# Patient Record
Sex: FEMALE | Race: WHITE | NOT HISPANIC OR LATINO | URBAN - NONMETROPOLITAN AREA
[De-identification: names, ages, dates, MRNs, and addresses within clinical notes are randomized per-mention and may not be internally consistent; named-entity substitution may affect disease eponyms.]

---

## 2023-03-07 ENCOUNTER — HOSPITAL ENCOUNTER (INPATIENT)
Facility: HOSPITAL | Age: 28
LOS: 3 days | Discharge: HOME | DRG: 885 | End: 2023-03-10
Attending: PSYCHIATRY & NEUROLOGY | Admitting: PSYCHIATRY & NEUROLOGY
Payer: COMMERCIAL

## 2023-03-07 DIAGNOSIS — F11.20 OPIOID USE DISORDER, SEVERE, DEPENDENCE: Primary | ICD-10-CM

## 2023-03-07 PROBLEM — F15.20 METHAMPHETAMINE USE DISORDER, SEVERE: Status: ACTIVE | Noted: 2023-03-07

## 2023-03-07 PROBLEM — F31.9 BIPOLAR I DISORDER WITH MIXED FEATURES: Status: ACTIVE | Noted: 2023-03-07

## 2023-03-07 PROBLEM — F12.20 TETRAHYDROCANNABINOL (THC) USE DISORDER, SEVERE, DEPENDENCE: Status: ACTIVE | Noted: 2023-03-07

## 2023-03-07 PROBLEM — R45.851 SUICIDAL IDEATION: Status: ACTIVE | Noted: 2023-03-07

## 2023-03-07 PROBLEM — F17.200 NICOTINE USE DISORDER: Status: ACTIVE | Noted: 2023-03-07

## 2023-03-07 LAB
QT INTERVAL: 344 MS
QTC INTERVAL: 448 MS

## 2023-03-07 PROCEDURE — 93010 ELECTROCARDIOGRAM REPORT: CPT | Performed by: INTERNAL MEDICINE

## 2023-03-07 PROCEDURE — 99223 1ST HOSP IP/OBS HIGH 75: CPT | Performed by: PSYCHIATRY & NEUROLOGY

## 2023-03-07 PROCEDURE — 93005 ELECTROCARDIOGRAM TRACING: CPT | Performed by: PSYCHIATRY & NEUROLOGY

## 2023-03-07 RX ORDER — ONDANSETRON 4 MG/1
4 TABLET, FILM COATED ORAL EVERY 8 HOURS PRN
COMMUNITY
End: 2023-03-10 | Stop reason: HOSPADM

## 2023-03-07 RX ORDER — VENLAFAXINE HYDROCHLORIDE 75 MG/1
75 CAPSULE, EXTENDED RELEASE ORAL EVERY MORNING
Status: ON HOLD | COMMUNITY
End: 2023-03-10 | Stop reason: SDUPTHER

## 2023-03-07 RX ORDER — QUETIAPINE FUMARATE 100 MG/1
100 TABLET, FILM COATED ORAL NIGHTLY
Status: ON HOLD | COMMUNITY
End: 2023-03-10 | Stop reason: SDUPTHER

## 2023-03-07 RX ORDER — SENNA PLUS 8.6 MG/1
2 TABLET ORAL NIGHTLY PRN
COMMUNITY
End: 2023-03-10 | Stop reason: HOSPADM

## 2023-03-07 RX ORDER — BUSPIRONE HYDROCHLORIDE 5 MG/1
5 TABLET ORAL 3 TIMES DAILY
Status: DISCONTINUED | OUTPATIENT
Start: 2023-03-07 | End: 2023-03-10 | Stop reason: HOSPADM

## 2023-03-07 RX ORDER — QUETIAPINE FUMARATE 100 MG/1
50 TABLET, FILM COATED ORAL EVERY 12 HOURS SCHEDULED
Status: DISCONTINUED | OUTPATIENT
Start: 2023-03-07 | End: 2023-03-10 | Stop reason: HOSPADM

## 2023-03-07 RX ORDER — HYDROXYZINE HYDROCHLORIDE 25 MG/1
50 TABLET, FILM COATED ORAL EVERY 6 HOURS PRN
Status: DISCONTINUED | OUTPATIENT
Start: 2023-03-07 | End: 2023-03-10 | Stop reason: HOSPADM

## 2023-03-07 RX ORDER — QUETIAPINE FUMARATE 100 MG/1
100 TABLET, FILM COATED ORAL NIGHTLY
Status: CANCELLED | OUTPATIENT
Start: 2023-03-07

## 2023-03-07 RX ORDER — BUPRENORPHINE HYDROCHLORIDE AND NALOXONE HYDROCHLORIDE DIHYDRATE 8; 2 MG/1; MG/1
1.75 TABLET SUBLINGUAL EVERY MORNING
COMMUNITY
Start: 2023-02-15 | End: 2023-03-10 | Stop reason: HOSPADM

## 2023-03-07 RX ORDER — NICOTINE 21 MG/24HR
1 PATCH, TRANSDERMAL 24 HOURS TRANSDERMAL
Status: DISCONTINUED | OUTPATIENT
Start: 2023-03-07 | End: 2023-03-10 | Stop reason: HOSPADM

## 2023-03-07 RX ORDER — BENZTROPINE MESYLATE 1 MG/1
2 TABLET ORAL ONCE AS NEEDED
Status: DISCONTINUED | OUTPATIENT
Start: 2023-03-07 | End: 2023-03-10 | Stop reason: HOSPADM

## 2023-03-07 RX ORDER — NORGESTIMATE AND ETHINYL ESTRADIOL 7DAYSX3 28
1 KIT ORAL DAILY
COMMUNITY
Start: 2023-01-17

## 2023-03-07 RX ORDER — BENZTROPINE MESYLATE 1 MG/ML
1 INJECTION INTRAMUSCULAR; INTRAVENOUS ONCE AS NEEDED
Status: DISCONTINUED | OUTPATIENT
Start: 2023-03-07 | End: 2023-03-10 | Stop reason: HOSPADM

## 2023-03-07 RX ORDER — SENNA PLUS 8.6 MG/1
2 TABLET ORAL NIGHTLY PRN
Status: CANCELLED | OUTPATIENT
Start: 2023-03-07

## 2023-03-07 RX ORDER — BUSPIRONE HYDROCHLORIDE 5 MG/1
5 TABLET ORAL 2 TIMES DAILY
Status: CANCELLED | OUTPATIENT
Start: 2023-03-07

## 2023-03-07 RX ORDER — LOPERAMIDE HYDROCHLORIDE 2 MG/1
2 CAPSULE ORAL
Status: DISCONTINUED | OUTPATIENT
Start: 2023-03-07 | End: 2023-03-10 | Stop reason: HOSPADM

## 2023-03-07 RX ORDER — BENZONATATE 100 MG/1
100 CAPSULE ORAL 3 TIMES DAILY PRN
Status: DISCONTINUED | OUTPATIENT
Start: 2023-03-07 | End: 2023-03-10 | Stop reason: HOSPADM

## 2023-03-07 RX ORDER — IBUPROFEN 400 MG/1
400 TABLET ORAL EVERY 6 HOURS PRN
Status: DISCONTINUED | OUTPATIENT
Start: 2023-03-07 | End: 2023-03-10 | Stop reason: HOSPADM

## 2023-03-07 RX ORDER — ECHINACEA PURPUREA EXTRACT 125 MG
2 TABLET ORAL AS NEEDED
Status: DISCONTINUED | OUTPATIENT
Start: 2023-03-07 | End: 2023-03-10 | Stop reason: HOSPADM

## 2023-03-07 RX ORDER — BUPRENORPHINE HYDROCHLORIDE AND NALOXONE HYDROCHLORIDE DIHYDRATE 8; 2 MG/1; MG/1
1.75 TABLET SUBLINGUAL EVERY MORNING
Status: CANCELLED | OUTPATIENT
Start: 2023-03-08

## 2023-03-07 RX ORDER — ACETAMINOPHEN 500 MG/1
1000 TABLET, FILM COATED ORAL 3 TIMES DAILY PRN
COMMUNITY
Start: 2023-01-23 | End: 2023-03-10 | Stop reason: HOSPADM

## 2023-03-07 RX ORDER — VENLAFAXINE HYDROCHLORIDE 37.5 MG/1
37.5 CAPSULE, EXTENDED RELEASE ORAL
Status: DISCONTINUED | OUTPATIENT
Start: 2023-03-07 | End: 2023-03-10 | Stop reason: HOSPADM

## 2023-03-07 RX ORDER — SODIUM FLUORIDE 6 MG/ML
1 PASTE, DENTIFRICE DENTAL DAILY
COMMUNITY
Start: 2023-02-06

## 2023-03-07 RX ORDER — ONDANSETRON 4 MG/1
4 TABLET, FILM COATED ORAL EVERY 6 HOURS PRN
Status: DISCONTINUED | OUTPATIENT
Start: 2023-03-07 | End: 2023-03-10 | Stop reason: HOSPADM

## 2023-03-07 RX ORDER — ALUMINA, MAGNESIA, AND SIMETHICONE 2400; 2400; 240 MG/30ML; MG/30ML; MG/30ML
15 SUSPENSION ORAL EVERY 6 HOURS PRN
Status: DISCONTINUED | OUTPATIENT
Start: 2023-03-07 | End: 2023-03-10 | Stop reason: HOSPADM

## 2023-03-07 RX ORDER — BUSPIRONE HYDROCHLORIDE 5 MG/1
5 TABLET ORAL 2 TIMES DAILY
Status: ON HOLD | COMMUNITY
Start: 2023-02-07 | End: 2023-03-10 | Stop reason: SDUPTHER

## 2023-03-07 RX ORDER — FAMOTIDINE 20 MG/1
20 TABLET, FILM COATED ORAL 2 TIMES DAILY PRN
Status: DISCONTINUED | OUTPATIENT
Start: 2023-03-07 | End: 2023-03-10 | Stop reason: HOSPADM

## 2023-03-07 RX ORDER — IBUPROFEN 400 MG/1
600 TABLET ORAL EVERY 6 HOURS PRN
Status: CANCELLED | OUTPATIENT
Start: 2023-03-07

## 2023-03-07 RX ORDER — GUANFACINE 1 MG/1
1 TABLET ORAL EVERY MORNING
Status: CANCELLED | OUTPATIENT
Start: 2023-03-08

## 2023-03-07 RX ORDER — VENLAFAXINE HYDROCHLORIDE 75 MG/1
75 CAPSULE, EXTENDED RELEASE ORAL EVERY MORNING
Status: CANCELLED | OUTPATIENT
Start: 2023-03-08

## 2023-03-07 RX ORDER — ACETAMINOPHEN 500 MG
1000 TABLET ORAL 3 TIMES DAILY PRN
Status: CANCELLED | OUTPATIENT
Start: 2023-03-07

## 2023-03-07 RX ORDER — TRAZODONE HYDROCHLORIDE 50 MG/1
50 TABLET ORAL NIGHTLY PRN
Status: DISCONTINUED | OUTPATIENT
Start: 2023-03-07 | End: 2023-03-10 | Stop reason: HOSPADM

## 2023-03-07 RX ORDER — ONDANSETRON 4 MG/1
4 TABLET, FILM COATED ORAL EVERY 8 HOURS PRN
Status: CANCELLED | OUTPATIENT
Start: 2023-03-07

## 2023-03-07 RX ORDER — GUANFACINE 1 MG/1
1 TABLET ORAL EVERY MORNING
COMMUNITY
End: 2023-03-10 | Stop reason: HOSPADM

## 2023-03-07 RX ORDER — NALOXONE HYDROCHLORIDE 4 MG/.1ML
1 SPRAY NASAL AS NEEDED
COMMUNITY

## 2023-03-07 RX ORDER — IBUPROFEN 600 MG/1
600 TABLET ORAL EVERY 6 HOURS PRN
COMMUNITY
Start: 2023-03-05 | End: 2023-03-10 | Stop reason: HOSPADM

## 2023-03-07 RX ORDER — ACETAMINOPHEN 325 MG/1
650 TABLET ORAL EVERY 6 HOURS PRN
Status: DISCONTINUED | OUTPATIENT
Start: 2023-03-07 | End: 2023-03-10 | Stop reason: HOSPADM

## 2023-03-07 RX ADMIN — BUSPIRONE HYDROCHLORIDE 5 MG: 5 TABLET ORAL at 15:36

## 2023-03-07 RX ADMIN — QUETIAPINE FUMARATE 50 MG: 100 TABLET ORAL at 23:04

## 2023-03-07 RX ADMIN — NICOTINE TRANSDERMAL SYSTEM 1 PATCH: 21 PATCH, EXTENDED RELEASE TRANSDERMAL at 11:35

## 2023-03-07 RX ADMIN — QUETIAPINE FUMARATE 50 MG: 100 TABLET ORAL at 14:43

## 2023-03-07 RX ADMIN — BUSPIRONE HYDROCHLORIDE 5 MG: 5 TABLET ORAL at 23:04

## 2023-03-07 RX ADMIN — VENLAFAXINE HYDROCHLORIDE 37.5 MG: 37.5 CAPSULE, EXTENDED RELEASE ORAL at 14:43

## 2023-03-07 NOTE — PROGRESS NOTES
Navigator is helping Primary Therapist with discharge planning for patient. Zullyator completed the following referrals on this day:    [facility] - [phone]  -Sent 3/7  -Breann asks that we send updated MD notes closer to discharge. 3/7

## 2023-03-07 NOTE — DISCHARGE PLACEMENT REQUEST
"Nory Miller (27 y.o. Female)     Date of Birth   1995    Social Security Number       Address   [address] [address]    Home Phone   [phone]    MRN   [id number]       Scientologist   None    Marital Status   Legally                             Admission Date   3/7/23    Admission Type   Urgent    Admitting Provider   Denys Duvall MD    Attending Provider   Denys Duvall MD    Department, Room/Bed   [facility] ADULT PSYCHIATRIC, 1013/2S       Discharge Date       Discharge Disposition       Discharge Destination                               Attending Provider: Denys Duvall MD    Allergies: Not on File    Isolation: None   Infection: None   Code Status: CPR    Ht: 152.4 cm (60\")   Wt: 62.1 kg (136 lb 12.8 oz)    Admission Cmt: None   Principal Problem: Suicidal ideation [R45.851]                 Active Insurance as of 3/7/2023     Primary Coverage     Payor Plan Insurance Group Employer/Plan Group    [employer] [place] [employer] [place]      Payor Plan Address Payor Plan Phone Number Payor Plan Fax Number Effective Dates    [address] [address] [phone]  3/7/2023 - None Entered    [place] [address]       Subscriber Name Subscriber Birth Date Member ID       NORY MILLER 1995 [id number]                 Emergency Contacts      (Rel.) Home Phone Work Phone Mobile Phone    MELISSA MILLER (Other) [phone] -- --               History & Physical      Massiel Caceres MD at 23 1213                INITIAL PSYCHIATRIC HISTORY & PHYSICAL    Patient Identification:  Name:  Nory Miller  Age:  27 y.o.  Sex:  female  :  1995  MRN:  [id number]   Visit Number:  [id number]  Primary Care Physician:  Provider, No Known    SUBJECTIVE    CC/Focus of Exam: suicidal thoughts    HPI: Nory Miller is a 27 y.o. female who was admitted on 3/7/2023 with complaints of suicidal thoughts. She states she was at [facility] " rehab for the last three weeks and she was at [facility] for a month prior to that. She state she was not given her psychiatric medications at the [facility] rehab and it made her more depressed and suicidal and she came to this hospital. She reports a past history of bipolar disorder in the past. She reports manic episodes lasting 24 to 72 hours where she has racing thoughts, pressured speech, has more goal directed activity, sleeps very little and doing high risk behaviors and ends up in crashing and in deep depression lasting weeks on end where she feels down, has no energy, doesn't enjoy anything, hopeless, and suicidal. She states she is feeling hyper as well as depressed at this time.     PAST PSYCHIATRIC HX: The patient reports she has been admitted to inpatient psych units six times in the last ten years.     SUBSTANCE USE HX: The patient reports a long history of substance use. First use was at age 12 when she used pain pills. Over time the use increased and the patient  continued to use despite negative consequences. The patient endorses symptoms of tolerance and withdrawals. Has tried to cut down and stop but has not been successful. Spends too much time and resources in pursuit of substance use. Longest period of sobriety is reported to be 1 year.  She states she was using meth, marijuana and pain pills but has not use any in more than six weeks. She is on Suboxone maintenance but was not given any in her last rehab for about two weeks.    SOCIAL HX:   Social History     Socioeconomic History   • Marital status: Legally    Tobacco Use   • Smoking status: Every Day     Packs/day: 0.50     Years: 14.00     Pack years: 7.00     Types: Cigarettes   • Smokeless tobacco: Never   Vaping Use   • Vaping Use: Former   • Passive vaping exposure: Yes   Substance and Sexual Activity   • Alcohol use: Not Currently   • Drug use: Not Currently     Comment: Pt stated she is a month and  3 weeks sober         History reviewed. No pertinent past medical history.       Past Surgical History:   Procedure Laterality Date   • D & C CERVICAL BIOPSY     • TONSILLECTOMY     • TUMOR EXCISION         Family History   Problem Relation Age of Onset   • Suicide Attempts Mother    • Paranoid behavior Mother    • Drug abuse Mother    • Depression Mother    • Bipolar disorder Mother    • Anxiety disorder Mother    • Alcohol abuse Mother    • Alcohol abuse Father    • Drug abuse Father    • Bipolar disorder Father    • Depression Brother    • Drug abuse Brother    • Alcohol abuse Brother    • Anxiety disorder Brother    • Bipolar disorder Brother          Medications Prior to Admission   Medication Sig Dispense Refill Last Dose   • buprenorphine-naloxone (SUBOXONE) 8-2 MG per SL tablet Place 1.75 tablets under the tongue Every Morning.   Past Month   • busPIRone (BUSPAR) 5 MG tablet Take 1 tablet by mouth 2 (Two) Times a Day.   Past Month   • guanFACINE (TENEX) 1 MG tablet Take 1 tablet by mouth Every Morning.   Past Month   • ibuprofen (ADVIL,MOTRIN) 600 MG tablet Take 1 tablet by mouth Every 6 (Six) Hours As Needed for Pain.   Past Month   • naloxone (NARCAN) 4 MG/0.1ML nasal spray 1 spray into the nostril(s) as directed by provider As Needed (overdosage).   Past Month   • norgestimate-ethinyl estradiol (ORTHO TRI-CYCLEN,TRINESSA) 0.18/0.215/0.25 MG-35 MCG per tablet Take 1 tablet by mouth Daily.   Past Month   • ondansetron (ZOFRAN) 4 MG tablet Take 1 tablet by mouth Every 8 (Eight) Hours As Needed for Nausea or Vomiting.   Past Month   • Pain Relief Extra Strength 500 MG tablet Take 2 tablets by mouth 3 (Three) Times a Day As Needed for Mild Pain or Moderate Pain.   Past Month   • QUEtiapine (SEROquel) 100 MG tablet Take 1 tablet by mouth Every Night.   Past Month   • senna (SENOKOT) 8.6 MG tablet Take 2 tablets by mouth At Night As Needed for Constipation.   Past Month   • Sodium Fluoride 5000 PPM 1.1 % paste  Take 1 application by mouth Daily. Use pea sized amount to brush teeth as directed daily   Past Month   • venlafaxine XR (EFFEXOR-XR) 75 MG 24 hr capsule Take 1 capsule by mouth Every Morning.   Past Month         ALLERGIES:  Patient has no allergy information on record.    Temp:  [97.9 °F (36.6 °C)-98.6 °F (37 °C)] 98.6 °F (37 °C)  Heart Rate:  [] 99  Resp:  [18] 18  BP: (106-113)/(69-77) 106/69    REVIEW OF SYSTEMS:  Review of Systems   Constitutional: Positive for chills and diaphoresis.   HENT: Negative.    Eyes: Negative.    Respiratory: Negative.    Cardiovascular: Negative.    Gastrointestinal: Negative.    Endocrine: Negative.    Genitourinary: Negative.    Musculoskeletal: Negative.    Skin: Negative.    Allergic/Immunologic: Negative.    Neurological: Negative.    Hematological: Negative.    Psychiatric/Behavioral: Positive for dysphoric mood and suicidal ideas. The patient is nervous/anxious and is hyperactive.         OBJECTIVE    PHYSICAL EXAM:  Physical Exam  Constitutional:  Appears well-developed and well-nourished.   HENT:   Head: Normocephalic and atraumatic.   Right Ear: External ear normal.   Left Ear: External ear normal.   Mouth/Throat: Oropharynx is clear and moist.   Eyes: Pupils are equal, round, and reactive to light. Conjunctivae and EOM are normal.   Neck: Normal range of motion. Neck supple.   Cardiovascular: Normal rate, regular rhythm and normal heart sounds.    Respiratory: Effort normal and breath sounds normal. No respiratory distress. No wheezes.   GI: Soft. Bowel sounds are normal.No distension. There is no tenderness.   Musculoskeletal: Normal range of motion. No edema or deformity.   Neurological:No cranial nerve deficit. Coordination normal.   Skin: Skin is warm and dry. No rash noted. No erythema.       MENTAL STATUS EXAM:   Hygiene:   fair  Cooperation:  Cooperative  Eye Contact:  Fair  Psychomotor Behavior:  Restless  Affect:  Full range  Hopelessness: 10  Speech:   Pressured  Pressured by  Thought Content:  Normal  Suicidal:  Suicidal Ideation  Homicidal:  None  Hallucinations:  None  Delusion:  None  Memory:  Intact  Orientation:  Person, Place, Time and Situation  Reliability:  fair  Insight:  Fair  Judgement:  Fair  Impulse Control:  Fair      Imaging Results (Last 24 Hours)     ** No results found for the last 24 hours. **           ECG/EMG Results (most recent)     Procedure Component Value Units Date/Time    ECG 12 Lead Other [[id number]] Collected: 03/07/23 0951     Updated: 03/07/23 0953     QT Interval 344 ms      QTC Interval 448 ms     Narrative:      Test Reason : Baseline Cardiac Status~  Blood Pressure :   */*   mmHG  Vent. Rate : 102 BPM     Atrial Rate : 102 BPM     P-R Int : 112 ms          QRS Dur :  78 ms      QT Int : 344 ms       P-R-T Axes :  55  54  39 degrees     QTc Int : 448 ms    Sinus tachycardia  Otherwise normal ECG  No previous ECGs available    Referred By: PAOLO           Confirmed By:            No results found for: GLUCOSE, BUN, CREATININE, EGFRIFNONA, EGFRIFAFRI, BCR, POTASSIUM, CO2, CALCIUM, PROTENTOTREF, ALBUMIN, LABIL2, BILIRUBIN, AST, ALT    No results found for: WBC, HGB, HCT, MCV, PLT    Last Urine Toxicity    There is no flowsheet data to display.         Brief Urine Lab Results     None          DATA  Labs reviewed. From OSH.   EKG reviewed. QTc 448 ms.   SEVEN reviewed.   Record reviewed. No previous treatment noted in this hospital for mental health or substance use problems.       Strengths: Motivated for treatment    Weaknesses:Poor social support, Substance use and Poor coping skills    Code status:  Full  Discussed code status with patient.    ASSESSMENT & PLAN:        Suicidal ideation  -SP3      Bipolar I disorder with mixed features (HCC)  -Start quetiapine 50 mg bid  -Start venlafaxine XR 37.5 mg daily  -Start buspirone 5 mg tid      Opioid use disorder, severe, dependence (HCC)  -Patient reports no use for 2-3 weeks       Methamphetamine use disorder, severe (HCC)  -Supportive treatment      Tetrahydrocannabinol (THC) use disorder, severe, dependence (HCC)  -Supportive treatment      Nicotine use disorder  -Nicotine replacement      The patient has been admitted for safety and stabilization.  Patient will be monitored for suicidality daily and maintained on Special Precautions Level 3 (q15 min checks) .  The patient will have individual and group therapy with a master's level therapist. A master treatment plan will be developed and agreed upon by the patient and his/her treatment team.  The patient's estimated length of stay in the hospital is 5-7 days.             Electronically signed by Massiel Caceres MD at 03/07/23 7595         Current Facility-Administered Medications   Medication Dose Route Frequency Provider Last Rate Last Admin   • acetaminophen (TYLENOL) tablet 650 mg  650 mg Oral Q6H PRN Denys Duvall MD       • aluminum-magnesium hydroxide-simethicone (MAALOX MAX) 400-400-40 MG/5ML suspension 15 mL  15 mL Oral Q6H PRN Denys Duvall MD       • benzonatate (TESSALON) capsule 100 mg  100 mg Oral TID PRN Denys Duvall MD       • benztropine (COGENTIN) tablet 2 mg  2 mg Oral Once PRN Denys Duvall MD        Or   • benztropine (COGENTIN) injection 1 mg  1 mg Intramuscular Once PRN Denys Duvall MD       • busPIRone (BUSPAR) tablet 5 mg  5 mg Oral TID Massiel Caceres MD       • famotidine (PEPCID) tablet 20 mg  20 mg Oral BID PRN Denys Duvall MD       • hydrOXYzine (ATARAX) tablet 50 mg  50 mg Oral Q6H PRN Denys Duvall MD       • ibuprofen (ADVIL,MOTRIN) tablet 400 mg  400 mg Oral Q6H PRN Denys Duvall MD       • loperamide (IMODIUM) capsule 2 mg  2 mg Oral Q2H PRN Denys Duvall MD       • magnesium hydroxide (MILK OF MAGNESIA) suspension 10 mL  10 mL Oral Daily PRN Denys Duvall MD       • nicotine (NICODERM CQ) 21 MG/24HR patch 1 patch  1 patch Transdermal Q24H Denys Duvall MD   1  patch at 03/07/23 1135   • ondansetron (ZOFRAN) tablet 4 mg  4 mg Oral Q6H PRN Denys Duvall MD       • QUEtiapine (SEROquel) tablet 50 mg  50 mg Oral Q12H Massiel Caceres MD       • sodium chloride nasal spray 2 spray  2 spray Each Nare PRN Denys Duvall MD       • traZODone (DESYREL) tablet 50 mg  50 mg Oral Nightly PRN Denys Duvall MD       • venlafaxine XR (EFFEXOR-XR) 24 hr capsule 37.5 mg  37.5 mg Oral Daily With Breakfast Massiel Caceres MD         Physician Progress Notes (last 24 hours)  Notes from 03/06/23 1254 through 03/07/23 1254   No notes of this type exist for this encounter.
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
## 2023-03-07 NOTE — H&P
INITIAL PSYCHIATRIC HISTORY & PHYSICAL    Patient Identification:  Name:  Lawanda Miller  Age:  27 y.o.  Sex:  female  :  1995  MRN:  [id number]   Visit Number:  [id number]  Primary Care Physician:  Provider, No Known    SUBJECTIVE    CC/Focus of Exam: suicidal thoughts    HPI: Lawanda Miller is a 27 y.o. female who was admitted on 3/7/2023 with complaints of suicidal thoughts. She states she was at [facility] for the last three weeks and she was at [facility] for a month prior to that. She state she was not given her psychiatric medications at the [facility] and it made her more depressed and suicidal and she came to this hospital. She reports a past history of bipolar disorder in the past. She reports manic episodes lasting 24 to 72 hours where she has racing thoughts, pressured speech, has more goal directed activity, sleeps very little and doing high risk behaviors and ends up in crashing and in deep depression lasting weeks on end where she feels down, has no energy, doesn't enjoy anything, hopeless, and suicidal. She states she is feeling hyper as well as depressed at this time.     PAST PSYCHIATRIC HX: The patient reports she has been admitted to inpatient psych units six times in the last ten years.     SUBSTANCE USE HX: The patient reports a long history of substance use. First use was at age 12 when she used pain pills. Over time the use increased and the patient  continued to use despite negative consequences. The patient endorses symptoms of tolerance and withdrawals. Has tried to cut down and stop but has not been successful. Spends too much time and resources in pursuit of substance use. Longest period of sobriety is reported to be 1 year.  She states she was using meth, marijuana and pain pills but has not use any in more than six weeks. She is on Suboxone maintenance but was not given any in her last rehab for about two  weeks.    SOCIAL HX:   Social History     Socioeconomic History   • Marital status: Legally    Tobacco Use   • Smoking status: Every Day     Packs/day: 0.50     Years: 14.00     Pack years: 7.00     Types: Cigarettes   • Smokeless tobacco: Never   Vaping Use   • Vaping Use: Former   • Passive vaping exposure: Yes   Substance and Sexual Activity   • Alcohol use: Not Currently   • Drug use: Not Currently     Comment: Pt stated she is a month and 3 weeks sober         History reviewed. No pertinent past medical history.       Past Surgical History:   Procedure Laterality Date   • D & C CERVICAL BIOPSY     • TONSILLECTOMY     • TUMOR EXCISION         Family History   Problem Relation Age of Onset   • Suicide Attempts Mother    • Paranoid behavior Mother    • Drug abuse Mother    • Depression Mother    • Bipolar disorder Mother    • Anxiety disorder Mother    • Alcohol abuse Mother    • Alcohol abuse Father    • Drug abuse Father    • Bipolar disorder Father    • Depression Brother    • Drug abuse Brother    • Alcohol abuse Brother    • Anxiety disorder Brother    • Bipolar disorder Brother          Medications Prior to Admission   Medication Sig Dispense Refill Last Dose   • buprenorphine-naloxone (SUBOXONE) 8-2 MG per SL tablet Place 1.75 tablets under the tongue Every Morning.   Past Month   • busPIRone (BUSPAR) 5 MG tablet Take 1 tablet by mouth 2 (Two) Times a Day.   Past Month   • guanFACINE (TENEX) 1 MG tablet Take 1 tablet by mouth Every Morning.   Past Month   • ibuprofen (ADVIL,MOTRIN) 600 MG tablet Take 1 tablet by mouth Every 6 (Six) Hours As Needed for Pain.   Past Month   • naloxone (NARCAN) 4 MG/0.1ML nasal spray 1 spray into the nostril(s) as directed by provider As Needed (overdosage).   Past Month   • norgestimate-ethinyl estradiol (ORTHO TRI-CYCLEN,TRINESSA) 0.18/0.215/0.25 MG-35 MCG per tablet Take 1 tablet by mouth Daily.   Past Month   • ondansetron (ZOFRAN) 4 MG tablet Take 1 tablet by  mouth Every 8 (Eight) Hours As Needed for Nausea or Vomiting.   Past Month   • Pain Relief Extra Strength 500 MG tablet Take 2 tablets by mouth 3 (Three) Times a Day As Needed for Mild Pain or Moderate Pain.   Past Month   • QUEtiapine (SEROquel) 100 MG tablet Take 1 tablet by mouth Every Night.   Past Month   • senna (SENOKOT) 8.6 MG tablet Take 2 tablets by mouth At Night As Needed for Constipation.   Past Month   • Sodium Fluoride 5000 PPM 1.1 % paste Take 1 application by mouth Daily. Use pea sized amount to brush teeth as directed daily   Past Month   • venlafaxine XR (EFFEXOR-XR) 75 MG 24 hr capsule Take 1 capsule by mouth Every Morning.   Past Month         ALLERGIES:  Patient has no allergy information on record.    Temp:  [97.9 °F (36.6 °C)-98.6 °F (37 °C)] 98.6 °F (37 °C)  Heart Rate:  [] 99  Resp:  [18] 18  BP: (106-113)/(69-77) 106/69    REVIEW OF SYSTEMS:  Review of Systems   Constitutional: Positive for chills and diaphoresis.   HENT: Negative.    Eyes: Negative.    Respiratory: Negative.    Cardiovascular: Negative.    Gastrointestinal: Negative.    Endocrine: Negative.    Genitourinary: Negative.    Musculoskeletal: Negative.    Skin: Negative.    Allergic/Immunologic: Negative.    Neurological: Negative.    Hematological: Negative.    Psychiatric/Behavioral: Positive for dysphoric mood and suicidal ideas. The patient is nervous/anxious and is hyperactive.         OBJECTIVE    PHYSICAL EXAM:  Physical Exam  Constitutional:  Appears well-developed and well-nourished.   HENT:   Head: Normocephalic and atraumatic.   Right Ear: External ear normal.   Left Ear: External ear normal.   Mouth/Throat: Oropharynx is clear and moist.   Eyes: Pupils are equal, round, and reactive to light. Conjunctivae and EOM are normal.   Neck: Normal range of motion. Neck supple.   Cardiovascular: Normal rate, regular rhythm and normal heart sounds.    Respiratory: Effort normal and breath sounds normal. No respiratory  distress. No wheezes.   GI: Soft. Bowel sounds are normal.No distension. There is no tenderness.   Musculoskeletal: Normal range of motion. No edema or deformity.   Neurological:No cranial nerve deficit. Coordination normal.   Skin: Skin is warm and dry. No rash noted. No erythema.       MENTAL STATUS EXAM:   Hygiene:   fair  Cooperation:  Cooperative  Eye Contact:  Fair  Psychomotor Behavior:  Restless  Affect:  Full range  Hopelessness: 10  Speech:  Pressured  Pressured by  Thought Content:  Normal  Suicidal:  Suicidal Ideation  Homicidal:  None  Hallucinations:  None  Delusion:  None  Memory:  Intact  Orientation:  Person, Place, Time and Situation  Reliability:  fair  Insight:  Fair  Judgement:  Fair  Impulse Control:  Fair      Imaging Results (Last 24 Hours)     ** No results found for the last 24 hours. **           ECG/EMG Results (most recent)     Procedure Component Value Units Date/Time    ECG 12 Lead Other [[id number]] Collected: 03/07/23 0951     Updated: 03/07/23 0953     QT Interval 344 ms      QTC Interval 448 ms     Narrative:      Test Reason : Baseline Cardiac Status~  Blood Pressure :   */*   mmHG  Vent. Rate : 102 BPM     Atrial Rate : 102 BPM     P-R Int : 112 ms          QRS Dur :  78 ms      QT Int : 344 ms       P-R-T Axes :  55  54  39 degrees     QTc Int : 448 ms    Sinus tachycardia  Otherwise normal ECG  No previous ECGs available    Referred By: PAOLO           Confirmed By:            No results found for: GLUCOSE, BUN, CREATININE, EGFRIFNONA, EGFRIFAFRI, BCR, POTASSIUM, CO2, CALCIUM, PROTENTOTREF, ALBUMIN, LABIL2, BILIRUBIN, AST, ALT    No results found for: WBC, HGB, HCT, MCV, PLT    Last Urine Toxicity    There is no flowsheet data to display.         Brief Urine Lab Results     None          DATA  Labs reviewed. From OSH.   EKG reviewed. QTc 448 ms.   SEVEN reviewed.   Record reviewed. No previous treatment noted in this hospital for mental health or substance use problems.        Strengths: Motivated for treatment    Weaknesses:Poor social support, Substance use and Poor coping skills    Code status:  Full  Discussed code status with patient.    ASSESSMENT & PLAN:        Suicidal ideation  -SP3      Bipolar I disorder with mixed features (HCC)  -Start quetiapine 50 mg bid  -Start venlafaxine XR 37.5 mg daily  -Start buspirone 5 mg tid      Opioid use disorder, severe, dependence (HCC)  -Patient reports no use for 2-3 weeks      Methamphetamine use disorder, severe (HCC)  -Supportive treatment      Tetrahydrocannabinol (THC) use disorder, severe, dependence (HCC)  -Supportive treatment      Nicotine use disorder  -Nicotine replacement      The patient has been admitted for safety and stabilization.  Patient will be monitored for suicidality daily and maintained on Special Precautions Level 3 (q15 min checks) .  The patient will have individual and group therapy with a master's level therapist. A master treatment plan will be developed and agreed upon by the patient and his/her treatment team.  The patient's estimated length of stay in the hospital is 5-7 days.

## 2023-03-07 NOTE — PLAN OF CARE
Problem: Adult Behavioral Health Plan of Care  Goal: Plan of Care Review  Outcome: Ongoing, Progressing  Flowsheets  Taken 3/7/2023 1352  Progress: no change  Outcome Evaluation: PATIENT VERBALIZES ANXIETY AND DEPRESSION, SI WITH NO PLAN AS ONLY PROBLEMS THIS SHIFT. PATIENT IS AOX3, COOPERATIVE WITH NO S/S OF ACUTE DISTRESS NOTED.  NEW ORDERS: EFFEXOR 37.5, SEROQUEL 50MG, BUSPAR 5MG  Taken 3/7/2023 0739  Plan of Care Reviewed With: patient  Patient Agreement with Plan of Care: agrees   Goal Outcome Evaluation:  Plan of Care Reviewed With: patient  Patient Agreement with Plan of Care: agrees     Progress: no change  Outcome Evaluation: PATIENT VERBALIZES ANXIETY AND DEPRESSION, SI WITH NO PLAN AS ONLY PROBLEMS THIS SHIFT. PATIENT IS AOX3, COOPERATIVE WITH NO S/S OF ACUTE DISTRESS NOTED.  NEW ORDERS: EFFEXOR 37.5, SEROQUEL 50MG, BUSPAR 5MG

## 2023-03-07 NOTE — PLAN OF CARE
Goal Outcome Evaluation:  Plan of Care Reviewed With: patient            Pt stated she was at a rehab and been there 3 weeks and they wont let her have any of her psych medication so she has come here for us to find her rehab to go to that will let her take her meds. Pt stated without her medication she was having suicidal thougts. Pt is restless and walking around room and can not be still durign assessment. Pt stated she was real nervous at [facility] and they gave her a xanax and it worked really good.

## 2023-03-08 PROCEDURE — 99232 SBSQ HOSP IP/OBS MODERATE 35: CPT | Performed by: PSYCHIATRY & NEUROLOGY

## 2023-03-08 RX ADMIN — BUSPIRONE HYDROCHLORIDE 5 MG: 5 TABLET ORAL at 15:50

## 2023-03-08 RX ADMIN — NICOTINE TRANSDERMAL SYSTEM 1 PATCH: 21 PATCH, EXTENDED RELEASE TRANSDERMAL at 08:14

## 2023-03-08 RX ADMIN — QUETIAPINE FUMARATE 50 MG: 100 TABLET ORAL at 20:18

## 2023-03-08 RX ADMIN — BUSPIRONE HYDROCHLORIDE 5 MG: 5 TABLET ORAL at 20:18

## 2023-03-08 RX ADMIN — VENLAFAXINE HYDROCHLORIDE 37.5 MG: 37.5 CAPSULE, EXTENDED RELEASE ORAL at 08:14

## 2023-03-08 RX ADMIN — BUSPIRONE HYDROCHLORIDE 5 MG: 5 TABLET ORAL at 08:14

## 2023-03-08 RX ADMIN — QUETIAPINE FUMARATE 50 MG: 100 TABLET ORAL at 08:14

## 2023-03-08 RX ADMIN — TRAZODONE HYDROCHLORIDE 50 MG: 50 TABLET ORAL at 22:14

## 2023-03-08 NOTE — PLAN OF CARE
Goal Outcome Evaluation:        Problem: Adult Behavioral Health Plan of Care  Goal: Patient-Specific Goal (Individualization)  Outcome: Ongoing, Progressing  Flowsheets  Taken 3/7/2023 1137  Patient-Specific Goals (Include Timeframe): Identify 2-3 coping skills, address relapse prevention methods, complete aftercrare plans, and deny SI/HI prior to discharge.  Individualized Care Needs: Therapist to offer 1-4 therapy sessions, aftercare planning, safety planning, family education, group therapy, brief CBT/MI interventions.  Anxieties, Fears or Concerns: none verbalized  Taken 3/7/2023 1002  Patient Personal Strengths:   resilient   resourceful   parenting skills   self-awareness   motivated for recovery   motivated for treatment   positive educational history   spiritual/[place] support   intellectual cognitive skills  Patient Vulnerabilities:   substance abuse/addiction   poor impulse control   housing insecurity   occupational insecurity   limited support system   history of unsuccessful treatment   adverse childhood experience(s)   traumatic event  Goal: Optimized Coping Skills in Response to Life Stressors  Outcome: Ongoing, Progressing  Flowsheets (Taken 3/7/2023 1137)  Optimized Coping Skills in Response to Life Stressors: making progress toward outcome  Intervention: Promote Effective Coping Strategies  Flowsheets (Taken 3/8/2023 1346)  Supportive Measures:   active listening utilized   counseling provided   decision-making supported   goal-setting facilitated   positive reinforcement provided   verbalization of feelings encouraged  Goal: Develops/Participates in Therapeutic Beaver to Support Successful Transition  Outcome: Ongoing, Progressing  Flowsheets (Taken 3/7/2023 1137)  Develops/Participates in Therapeutic Beaver to Support Successful Transition: making progress toward outcome  Intervention: Foster Therapeutic Beaver  Flowsheets (Taken 3/8/2023 4962 by Dave Guillen RN)  Trust  Relationship/Rapport:   care explained   choices provided   emotional support provided   empathic listening provided   questions answered   questions encouraged   reassurance provided   thoughts/feelings acknowledged  Intervention: Mutually Develop Transition Plan  Flowsheets  Taken 3/8/2023 1344  Discharge Coordination/Progress: Patient has [employer] Medicaid. Patient has referral pending with [facility].  Transportation Anticipated:   public transportation   agency  Transportation Concerns: no car  Current Discharge Risk:   substance use/abuse   homeless   psychiatric illness   lack of support system/caregiver  Concerns to be Addressed:   substance/tobacco abuse/use   coping/stress   suicidal   mental health   medication   discharge planning  Readmission Within the Last 30 Days: no previous admission in last 30 days  Patient/Family Anticipated Services at Transition: rehabilitation services  Patient's Choice of Community Agency(s): [facility]  Patient/Family Anticipates Transition to: inpatient rehabilitation facility  Offered/Gave Vendor List: no  Taken 3/7/2023 1137  Outpatient/Agency/Support Group Needs: residential services  Transition Support:   follow-up care discussed   community resources reviewed   crisis management plan promoted   crisis management plan verbalized   follow-up care coordinated  Anticipated Discharge Disposition: residential substance use unit         DATA:  Therapist met with Patient individually this date. Patient agreeable to discuss current treatment progress and discharge concerns.     CLINICAL MANUVERING/INTERVENTIONS:  Assisted Patient in processing session content; acknowledged and normalized Patient’s thoughts, feelings, and concerns by utilizing a person-centered approach in efforts to build appropriate rapport and a positive therapeutic relationship with open and honest communication. Allowed Patient to ventilate regarding current stressors and triggers for negative emotions and thoughts in a  safe nonjudgmental environment with unconditional positive regard, active listening skills, and empathy. Therapist implemented motivational interviewing techniques to assist Patient with exploring personal growth and change and discussed distress tolerance skills, self soothing techniques, and applied cognitive behavioral strategies to facilitate identification of maladaptive patterns of thinking and behavior.Therapist utilized dialectical behavior techniques to teach and model emotional regulation and relaxation methods. Therapist assisted Patient with identifying and implementing healthier coping strategies. Therapist assisted Patient with safety planning; Patient agreed to continue honest communication with Treatment Team while inpatient and identify any SI/HI.  Patient encouraged to seek nearest ER or contact 911 if danger to self or others post discharge.     ASSESSMENT:    Therapist met with patient 1-1 on this date. Patient continues to receive treatment for depression with SI. Patient continues to report high anxiety and depression, denies current HI/AVH. Patient reports intermittent SI although it is improving. Patient reports she is having really strong cravings and is requesting to find a rehab that will also allow Suboxone. Patient has referral pending with [facility], updated notes sent on this date. Therapist has discussed with patient how new rehab placement options are limited because she has already been in rehab for the past three weeks and denies using since then. Patient initially presents from [facility] in [place] and reports she was not allowed to take psych medications while at facility. Patient also requests a facility close to [facility] if possible because that is where her child is at this time.      PLAN:   Patient will continue stabilization. Patient will continue to receive services offered by Treatment Team.     Therapist recommends [facility] inpatient rehab. Referral pending with [facility].

## 2023-03-08 NOTE — PROGRESS NOTES
"INPATIENT PSYCHIATRIC PROGRESS NOTE    Name:  Lawanda Miller  :  1995  MRN:  [id number]  Visit Number:  [id number]  Length of stay:  1    SUBJECTIVE    CC/Focus of Exam: Bipolar disorder    INTERVAL HISTORY:  The patient states she is feeling better and the medications are helping. Having some racing thoughts about what she will do about her child and also about where she will go for rehab.  She states the suicidal thoughts have subsided somewhat but are still there.   Depression rating 8/10  Anxiety rating 10/10  Sleep: good  Withdrawal sx: body aches  Craving: 1010 for pain pills    Review of Systems   Respiratory: Negative.    Cardiovascular: Negative.    Gastrointestinal: Negative.    Musculoskeletal: Positive for myalgias.   Psychiatric/Behavioral: Positive for dysphoric mood and suicidal ideas. The patient is nervous/anxious.        OBJECTIVE    Temp:  [96.9 °F (36.1 °C)-98.3 °F (36.8 °C)] 98.3 °F (36.8 °C)  Heart Rate:  [] 110  Resp:  [16-18] 18  BP: (103-114)/(65-74) 109/71    MENTAL STATUS EXAM:  Appearance:Casually dressed, good hygeine.   Cooperation:Cooperative  Psychomotor: No psychomotor agitation/retardation, No EPS, No motor tics  Speech-normal rate, amount.  Mood \"depressed\"   Affect-congruent, appropriate, stable  Thought Content-goal directed, no delusional material present  Thought process-linear, organized.  Suicidality: + SI  Homicidality: No HI  Perception: No AH/VH  Insight-fair   Judgement-fair    Lab Results (last 24 hours)     ** No results found for the last 24 hours. **             Imaging Results (Last 24 Hours)     ** No results found for the last 24 hours. **             ECG/EMG Results (most recent)     Procedure Component Value Units Date/Time    ECG 12 Lead Other [[id number]] Collected: 23 0951     Updated: 23 185     QT Interval 344 ms      QTC Interval 448 ms     Narrative:      Test Reason : Baseline Cardiac Status~  Blood Pressure :   */*   " mmHG  Vent. Rate : 102 BPM     Atrial Rate : 102 BPM     P-R Int : 112 ms          QRS Dur :  78 ms      QT Int : 344 ms       P-R-T Axes :  55  54  39 degrees     QTc Int : 448 ms    Sinus tachycardia  Otherwise normal ECG  No previous ECGs available  Confirmed by Haseeb Szymanski ([id number]) on 3/7/2023 6:44:50 PM    Referred By: PAOLO           Confirmed By: Haseeb Szymanski           ALLERGIES: Patient has no known allergies.    Medication Review:   Scheduled Medications:  busPIRone, 5 mg, Oral, TID  nicotine, 1 patch, Transdermal, Q24H  QUEtiapine, 50 mg, Oral, Q12H  venlafaxine XR, 37.5 mg, Oral, Daily With Breakfast         PRN Medications:  •  acetaminophen  •  aluminum-magnesium hydroxide-simethicone  •  benzonatate  •  benztropine **OR** benztropine  •  famotidine  •  hydrOXYzine  •  ibuprofen  •  loperamide  •  magnesium hydroxide  •  ondansetron  •  sodium chloride  •  traZODone   All medications reviewed.    ASSESSMENT & PLAN:      Suicidal ideation  -SP3       Bipolar I disorder with mixed features (HCC)  -Continue quetiapine 50 mg bid  -Continue venlafaxine XR 37.5 mg daily  -Continue buspirone 5 mg tid       Opioid use disorder, severe, dependence (HCC)  -Patient reports no use for 2-3 weeks       Methamphetamine use disorder, severe (HCC)  -Supportive treatment       Tetrahydrocannabinol (THC) use disorder, severe, dependence (HCC)  -Supportive treatment       Nicotine use disorder  -Nicotine replacement    Special precautions: Special Precautions Level 3 (q15 min checks) .    Behavioral Health Treatment Plan and Problem List: I have reviewed and approved the Behavioral Health Treatment Plan and Problem list.  The patient has had a chance to review and agrees with the treatment plan.     Clinician:  Massiel Caceres MD  03/08/23  10:51 EST

## 2023-03-08 NOTE — PLAN OF CARE
Goal Outcome Evaluation:  Plan of Care Reviewed With: patient  Patient Agreement with Plan of Care: agrees     Progress: improving       Pt reported feeling irritated during assessment with anxiety of 10 and depression of 10. Pt denied SI/HI/AVH. Pt did not seem interested in taking part in the assessment and answered questions with short direct responses. Pt was not social this evening but did come out of her room for a snack.

## 2023-03-08 NOTE — PROGRESS NOTES
Navigator is helping Primary Therapist with discharge planning for patient. Navigator completed the following referrals on this day:     [facility] - [phone]  -Sent 3/7  -[facility] asks that we send updated MD notes closer to discharge. 3/7  -Faxed today's MD note for review. 3/8  -[facility] states at this time they are denying patient. Treatment team to send updated clinicals closer to discharge.  3/8

## 2023-03-08 NOTE — PLAN OF CARE
Problem: Adult Behavioral Health Plan of Care  Goal: Plan of Care Review  Outcome: Ongoing, Progressing  Flowsheets  Taken 3/8/2023 1357  Progress: improving  Outcome Evaluation: PATIENT VERBALIZES MODERATER ANXIETY AND DEPRESSION AS ONLY PROBLEMS THIS SHIFT. PATIENT IS AOX3, COOPERATIVE WITHJ NO S/S OF ACUTE DISTRESS NOTED. NNO NOTED  Taken 3/8/2023 0731  Plan of Care Reviewed With: patient  Patient Agreement with Plan of Care: agrees   Goal Outcome Evaluation:  Plan of Care Reviewed With: patient  Patient Agreement with Plan of Care: agrees     Progress: improving  Outcome Evaluation: PATIENT VERBALIZES MODERATER ANXIETY AND DEPRESSION AS ONLY PROBLEMS THIS SHIFT. PATIENT IS AOX3, COOPERATIVE WITHJ NO S/S OF ACUTE DISTRESS NOTED. NNO NOTED

## 2023-03-09 PROCEDURE — 99232 SBSQ HOSP IP/OBS MODERATE 35: CPT | Performed by: PSYCHIATRY & NEUROLOGY

## 2023-03-09 RX ORDER — BUPRENORPHINE HYDROCHLORIDE AND NALOXONE HYDROCHLORIDE DIHYDRATE 8; 2 MG/1; MG/1
1 TABLET SUBLINGUAL DAILY
Status: DISCONTINUED | OUTPATIENT
Start: 2023-03-09 | End: 2023-03-10 | Stop reason: HOSPADM

## 2023-03-09 RX ADMIN — NICOTINE TRANSDERMAL SYSTEM 1 PATCH: 21 PATCH, EXTENDED RELEASE TRANSDERMAL at 08:19

## 2023-03-09 RX ADMIN — BUSPIRONE HYDROCHLORIDE 5 MG: 5 TABLET ORAL at 08:19

## 2023-03-09 RX ADMIN — TRAZODONE HYDROCHLORIDE 50 MG: 50 TABLET ORAL at 20:33

## 2023-03-09 RX ADMIN — BUSPIRONE HYDROCHLORIDE 5 MG: 5 TABLET ORAL at 15:53

## 2023-03-09 RX ADMIN — BUPRENORPHINE HYDROCHLORIDE AND NALOXONE HYDROCHLORIDE DIHYDRATE 1 TABLET: 8; 2 TABLET SUBLINGUAL at 16:07

## 2023-03-09 RX ADMIN — QUETIAPINE FUMARATE 50 MG: 100 TABLET ORAL at 20:33

## 2023-03-09 RX ADMIN — VENLAFAXINE HYDROCHLORIDE 37.5 MG: 37.5 CAPSULE, EXTENDED RELEASE ORAL at 08:19

## 2023-03-09 RX ADMIN — QUETIAPINE FUMARATE 50 MG: 100 TABLET ORAL at 08:19

## 2023-03-09 RX ADMIN — BUSPIRONE HYDROCHLORIDE 5 MG: 5 TABLET ORAL at 20:33

## 2023-03-09 NOTE — PLAN OF CARE
Goal Outcome Evaluation:  Plan of Care Reviewed With: patient  Patient Agreement with Plan of Care: agrees     Progress: improving   Patient rates anxiety 8 and depression 5, denies thoughts of harming self and others, and denies hallucinations.

## 2023-03-09 NOTE — PROGRESS NOTES
"INPATIENT PSYCHIATRIC PROGRESS NOTE    Name:  Lawanda Miller  :  1995  MRN:  [id number]  Visit Number:  [id number]  Length of stay:  2    SUBJECTIVE    CC/Focus of Exam: Bipolar disorder    INTERVAL HISTORY:  The patient states she is having cravings for opioid but is no longer having suicidal thoughts. She states she is having a body aches and they are worse today related to opioid use and it is making her depression and anxiety worse.   Depression rating 10/10  Anxiety rating 10/10  Sleep: good  Withdrawal sx: body aches  Craving: 10/10 for pain pills    Review of Systems   Respiratory: Negative.    Cardiovascular: Negative.    Gastrointestinal: Negative.    Musculoskeletal: Positive for myalgias.   Psychiatric/Behavioral: Positive for dysphoric mood and suicidal ideas. The patient is nervous/anxious.        OBJECTIVE    Temp:  [97.4 °F (36.3 °C)-98 °F (36.7 °C)] 97.4 °F (36.3 °C)  Heart Rate:  [] 72  Resp:  [16-18] 18  BP: (111)/(70-78) 111/70    MENTAL STATUS EXAM:  Appearance:Casually dressed, good hygeine.   Cooperation:Cooperative  Psychomotor: No psychomotor agitation/retardation, No EPS, No motor tics  Speech-normal rate, amount.  Mood \"depressed\"   Affect-congruent, appropriate, stable  Thought Content-goal directed, no delusional material present  Thought process-linear, organized.  Suicidality: No SI  Homicidality: No HI  Perception: No AH/VH  Insight-fair   Judgement-fair    Lab Results (last 24 hours)     ** No results found for the last 24 hours. **             Imaging Results (Last 24 Hours)     ** No results found for the last 24 hours. **             ECG/EMG Results (most recent)     Procedure Component Value Units Date/Time    ECG 12 Lead Other [[id number]] Collected: 23 0951     Updated: 23 1852     QT Interval 344 ms      QTC Interval 448 ms     Narrative:      Test Reason : Baseline Cardiac Status~  Blood Pressure :   */*   mmHG  Vent. Rate : 102 BPM     Atrial Rate " : 102 BPM     P-R Int : 112 ms          QRS Dur :  78 ms      QT Int : 344 ms       P-R-T Axes :  55  54  39 degrees     QTc Int : 448 ms    Sinus tachycardia  Otherwise normal ECG  No previous ECGs available  Confirmed by Haseeb Szymanski ([id number]) on 3/7/2023 6:44:50 PM    Referred By: PAOLO           Confirmed By: Haseeb Szymanski           ALLERGIES: Patient has no known allergies.    Medication Review:   Scheduled Medications:  busPIRone, 5 mg, Oral, TID  nicotine, 1 patch, Transdermal, Q24H  QUEtiapine, 50 mg, Oral, Q12H  venlafaxine XR, 37.5 mg, Oral, Daily With Breakfast         PRN Medications:  •  acetaminophen  •  aluminum-magnesium hydroxide-simethicone  •  benzonatate  •  benztropine **OR** benztropine  •  famotidine  •  hydrOXYzine  •  ibuprofen  •  loperamide  •  magnesium hydroxide  •  ondansetron  •  sodium chloride  •  traZODone   All medications reviewed.    ASSESSMENT & PLAN:      Suicidal ideation  -SP3       Bipolar I disorder with mixed features (HCC)  -Continue quetiapine 50 mg bid  -Increase venlafaxine XR 75 mg daily  -Continue buspirone 5 mg tid       Opioid use disorder, severe, dependence (HCC)  -Patient is interested in MAT. She has been accepted to [facility] where she can continue MAT. Will start her on Suboxone 8 mg daily today.        Methamphetamine use disorder, severe (HCC)  -Supportive treatment       Tetrahydrocannabinol (THC) use disorder, severe, dependence (HCC)  -Supportive treatment       Nicotine use disorder  -Nicotine replacement    Special precautions: Special Precautions Level 3 (q15 min checks) .    Behavioral Health Treatment Plan and Problem List: I have reviewed and approved the Behavioral Health Treatment Plan and Problem list.  The patient has had a chance to review and agrees with the treatment plan.     Clinician:  Massiel Caceres MD  03/09/23  13:27 EST

## 2023-03-09 NOTE — PLAN OF CARE
"Goal Outcome Evaluation:  Plan of Care Reviewed With: patient  Patient Agreement with Plan of Care: agrees     Progress: improving  Outcome Evaluation: Pt hyperverbal, rates anxiety 10/10, depression 8/10 and denies SI/HI/AVH. Pt states appetite has been \"pretty decent\" but states sleep has not been good.   Pt appeared to sleep throughout the night.  "

## 2023-03-09 NOTE — PLAN OF CARE
Goal Outcome Evaluation:        Problem: Adult Behavioral Health Plan of Care  Goal: Patient-Specific Goal (Individualization)  Outcome: Ongoing, Progressing  Flowsheets  Taken 3/7/2023 1137  Patient-Specific Goals (Include Timeframe): Identify 2-3 coping skills, address relapse prevention methods, complete aftercrare plans, and deny SI/HI prior to discharge.  Individualized Care Needs: Therapist to offer 1-4 therapy sessions, aftercare planning, safety planning, family education, group therapy, brief CBT/MI interventions.  Anxieties, Fears or Concerns: none verbalized  Taken 3/7/2023 1002  Patient Personal Strengths:  • resilient  • resourceful  • parenting skills  • self-awareness  • motivated for recovery  • motivated for treatment  • positive educational history  • spiritual/Tenriism support  • intellectual cognitive skills  Patient Vulnerabilities:  • substance abuse/addiction  • poor impulse control  • housing insecurity  • occupational insecurity  • limited support system  • history of unsuccessful treatment  • adverse childhood experience(s)  • traumatic event  Goal: Optimized Coping Skills in Response to Life Stressors  Outcome: Ongoing, Progressing  Flowsheets (Taken 3/7/2023 1137)  Optimized Coping Skills in Response to Life Stressors: making progress toward outcome  Intervention: Promote Effective Coping Strategies  Flowsheets (Taken 3/9/2023 1433)  Supportive Measures:  • active listening utilized  • counseling provided  • decision-making supported  • goal-setting facilitated  • verbalization of feelings encouraged  • positive reinforcement provided  Goal: Develops/Participates in Therapeutic Eagle to Support Successful Transition  Outcome: Ongoing, Progressing  Flowsheets (Taken 3/7/2023 1137)  Develops/Participates in Therapeutic Eagle to Support Successful Transition: making progress toward outcome  Intervention: Foster Therapeutic Eagle  Flowsheets (Taken 3/9/2023 0824 by Lu Tian,  RN)  Trust Relationship/Rapport:  • care explained  • choices provided  • emotional support provided  • empathic listening provided  • questions answered  • questions encouraged  • reassurance provided  • thoughts/feelings acknowledged  Intervention: Mutually Develop Transition Plan  Flowsheets  Taken 3/9/2023 1429  Discharge Coordination/Progress: Patient has [employer]. Patient referral pending with [facility], updated progress note faxed to intake.  Transportation Anticipated:  • agency  • public transportation  Transportation Concerns: no car  Current Discharge Risk:  • substance use/abuse  • homeless  • psychiatric illness  • lack of support system/caregiver  Concerns to be Addressed:  • substance/tobacco abuse/use  • coping/stress  • mental health  • discharge planning  • suicidal  • medication  Readmission Within the Last 30 Days: no previous admission in last 30 days  Patient/Family Anticipated Services at Transition: rehabilitation services  Patient's Choice of Community Agency(s): [facility]  Patient/Family Anticipates Transition to: inpatient rehabilitation facility  Offered/Gave Vendor List: no  Taken 3/7/2023 1135  Outpatient/Agency/Support Group Needs: residential services  Transition Support:  • follow-up care discussed  • community resources reviewed  • crisis management plan promoted  • crisis management plan verbalized  • follow-up care coordinated  Anticipated Discharge Disposition: residential substance use unit            DATA:  Therapist met with Patient individually this date. Patient agreeable to discuss current treatment progress and discharge concerns.     CLINICAL MANUVERING/INTERVENTIONS:  Assisted Patient in processing session content; acknowledged and normalized Patient’s thoughts, feelings, and concerns by utilizing a person-centered approach in efforts to build appropriate rapport and a positive therapeutic relationship with open and honest communication. Allowed Patient to ventilate  regarding current stressors and triggers for negative emotions and thoughts in a safe nonjudgmental environment with unconditional positive regard, active listening skills, and empathy. Therapist implemented motivational interviewing techniques to assist Patient with exploring personal growth and change and discussed distress tolerance skills, self soothing techniques, and applied cognitive behavioral strategies to facilitate identification of maladaptive patterns of thinking and behavior.Therapist utilized dialectical behavior techniques to teach and model emotional regulation and relaxation methods. Therapist assisted Patient with identifying and implementing healthier coping strategies. Therapist assisted Patient with safety planning; Patient agreed to continue honest communication with Treatment Team while inpatient and identify any SI/HI.  Patient encouraged to seek nearest ER or contact 911 if danger to self or others post discharge.     ASSESSMENT:    Patient continues to receive treatment for depression with SI. Patient continues to report high anxiety and depression. Patient reports SI has resolved, denies HI/AVH. Patient continues to report body aches and strong cravings. Therapist faxed updated progress note to [facility] since patient's SI has resolved, hopeful denial will be overturned. Patient's rehab placement options are limited because she has been sober for 2-3 weeks now. Patient brought in by [facility] in [place], [place].      16:07:   Therapist notified by Breann with [facility] that patient has been accepted, phone screening completed on this date. Patient will start suboxone today and discharge tomorrow if tolerated well. Therapist has updated Breann with [facility], patient will need to come with at least three days worth of all medications. Patient agreeable and looking forward to discharge.    PLAN:   Patient will continue stabilization. Patient will continue to receive services offered by Treatment Team.      Therapist recommends continue [facility] inpatient rehab services.

## 2023-03-10 VITALS
SYSTOLIC BLOOD PRESSURE: 105 MMHG | HEART RATE: 99 BPM | TEMPERATURE: 96.9 F | BODY MASS INDEX: 26.86 KG/M2 | DIASTOLIC BLOOD PRESSURE: 60 MMHG | OXYGEN SATURATION: 99 % | WEIGHT: 136.8 LBS | HEIGHT: 60 IN | RESPIRATION RATE: 18 BRPM

## 2023-03-10 PROCEDURE — 99238 HOSP IP/OBS DSCHRG MGMT 30/<: CPT | Performed by: PSYCHIATRY & NEUROLOGY

## 2023-03-10 RX ORDER — BUSPIRONE HYDROCHLORIDE 5 MG/1
5 TABLET ORAL 2 TIMES DAILY
Qty: 60 TABLET | Refills: 0 | Status: SHIPPED | OUTPATIENT
Start: 2023-03-10

## 2023-03-10 RX ORDER — BUPRENORPHINE HYDROCHLORIDE AND NALOXONE HYDROCHLORIDE DIHYDRATE 8; 2 MG/1; MG/1
1 TABLET SUBLINGUAL DAILY
Qty: 7 TABLET | Refills: 0 | Status: SHIPPED | OUTPATIENT
Start: 2023-03-11 | End: 2023-03-18

## 2023-03-10 RX ORDER — QUETIAPINE FUMARATE 100 MG/1
100 TABLET, FILM COATED ORAL NIGHTLY
Qty: 30 TABLET | Refills: 0 | Status: SHIPPED | OUTPATIENT
Start: 2023-03-10

## 2023-03-10 RX ORDER — VENLAFAXINE HYDROCHLORIDE 75 MG/1
75 CAPSULE, EXTENDED RELEASE ORAL EVERY MORNING
Qty: 30 CAPSULE | Refills: 0 | Status: SHIPPED | OUTPATIENT
Start: 2023-03-10

## 2023-03-10 RX ADMIN — BUPRENORPHINE HYDROCHLORIDE AND NALOXONE HYDROCHLORIDE DIHYDRATE 1 TABLET: 8; 2 TABLET SUBLINGUAL at 08:05

## 2023-03-10 RX ADMIN — QUETIAPINE FUMARATE 50 MG: 100 TABLET ORAL at 08:05

## 2023-03-10 RX ADMIN — VENLAFAXINE HYDROCHLORIDE 37.5 MG: 37.5 CAPSULE, EXTENDED RELEASE ORAL at 08:05

## 2023-03-10 RX ADMIN — BUSPIRONE HYDROCHLORIDE 5 MG: 5 TABLET ORAL at 08:05

## 2023-03-10 RX ADMIN — NICOTINE TRANSDERMAL SYSTEM 1 PATCH: 21 PATCH, EXTENDED RELEASE TRANSDERMAL at 08:04

## 2023-03-10 NOTE — CASE MANAGEMENT/SOCIAL WORK
Case Management/Social Work    Patient Name:  Lawanda Miller  YOB: 1995  MRN: [id number]  Admit Date:  3/7/2023    Patient has a bed at [email or web address] on this date, treatment team working to arrange transportation. Patient denies SI/HI/AVH, reports improvement in mood. Patient was glad to start back suboxone yesterday. Therapist has discussed healthy coping skills, safety planning, and relapse prevention with patient. Therapist has assisted patient in identifying risk factors which would indicate the need for higher level of care including thoughts to harm self or others and/or self-harming behavior. Encouraged patient to notify facility staff, call [phone], or present to nearest emergency room should any of these events occur.     Transportation arranged via [facility], ETA approximately 13:00.    Electronically signed by:  EHSAN Sheets  03/10/23 10:26 EST

## 2023-03-10 NOTE — PROGRESS NOTES
[facility]: Patient is being discharged on this day.     1130: Spoke with Sharmaine at [facility]. They received trip and are working to secure . ETA not available yet.      1054: Checked on transport. They received referral but will need a more specific diagnosis than substance abuse treatment.  Resent. Waiting on call back with  time.      1004: Faxed referral back to [facility].     940: Patient coded to [facility]. They state they will need a transportation referral signed by the MD to transport patient to [place]. Waiting on fax.  [facility] states once they receive the referral back they will contact [facility] to see if they can complete trip.

## 2023-03-10 NOTE — PROGRESS NOTES
Navigator is helping Primary Therapist with discharge planning for patient. Navigator completed the following referrals on this day:    [facility] - [phone]  -Spoke with Breann. Patient approved today and has a bed today at the [facility]. Breann to call back with  time.  3/10  -Per DORIAN Crain unable to provide transportation.  Bed letter to be sent so public transportation can be arranged.  3/10

## 2023-03-10 NOTE — PROGRESS NOTES
Behavioral Health Discharge Summary             Please fax within 24 hours of discharge to [facility] at: [phone]      Member Name: Lawanda Miller Member ID: [id number]   Authorization Number: [id number] Phone: [phone]   Member Address:  [place] [id number]   Discharge Date: 03/10/2023 Level of Care at Discharge:    Facility: [facility] Staff Completing Form: Sangeetha LEBRON RN U.R.   If the member is being discharged directly to a residential or extended care program, please specify the type below.   __Private Child-Caring Facility (PCC) Residential/Group Home   __Private Child-Caring Facility (PCC) Therapeutic Foster Care   __Residential Treatment Facility (RTF)   __Psychiatric Residential Treatment Facility (PRTF I or II)   __Long-Term Acute Inpatient Hospital Services or Extended Care Unit (ECU)   __Other (please specify):    Brief discharge summary of treatment received (for follow up by the case management team): D/C clinical with list of medications and follow up appts given to patient upon discharge.     BRIEF SUMMARY OF RECOMMENDATIONS FOR ONGOING TREATMENT     Discharged to where: inpatient Rehab   Discharge diagnoses: F31.9   Axis I:    Axis II:    Axis III:    Axis IV:    Axis V:    Does the member understand his/her DX?  Yes          Medication     Dose     Schedule Supply/  Quantity  Given at Discharge RX Provided  Yes/No  If Rx Provided, Quantity RX Prior Auth Required  Yes/No Prior Auth  Completed   suboxone  8-2 mg  Sl daily                                                                                        Does the member understand the reason for taking these medications? Yes                                                           FOLLOW-UP APPOINTMENTS   Please schedule within 7 days of discharge and provide appointment details for all referred services.    PCP/Other Providers Involved in Treatment:    Appointment Type: IP Rehab  Provider Name: [facility] Provider Phone: [phone] Appointment Date: 03/10/2023 Appointment Time: admit upon arrival      Assessment   (new to OP services)        Case Management    Is the member already enrolled in case management?  Yes/No  If yes, date the CM was notified:    If no, was the CM referral offered?  Yes/No  Accepted? Yes/No    Is the Release of Information in the chart? Yes/No:      Medication Management (for member discharged with psychiatric medications):      A&D Treatment (for member with substance abuse/   dependence in the past year):      Medical Condition (for member with a medical condition):    Other recommended treatment:    Do you have any concerns about the discharge plan?  No    If yes, explain:    Was the member involved in the discharge planning?  Yes    If no, explain:    Was a copy of the discharge plan provided to the member?  Yes    If no, explain:

## 2023-03-10 NOTE — PLAN OF CARE
Goal Outcome Evaluation:              Outcome Evaluation: Patient denies SI, HI, AVH. Reports good sleep and appetite. Seemed to sleep well during the night.

## 2023-03-10 NOTE — DISCHARGE SUMMARY
":  1995  MRN:  [id number]  Visit Number:  [id number]      Date of Admission:3/7/2023   Date of Discharge:  3/10/2023    Discharge Diagnosis:  Principal Problem:    Suicidal ideation  Active Problems:    Bipolar I disorder with mixed features (HCC)    Opioid use disorder, severe, dependence (HCC)    Methamphetamine use disorder, severe (HCC)    Tetrahydrocannabinol (THC) use disorder, severe, dependence (HCC)    Nicotine use disorder        Admission Diagnosis:  Suicidal ideation [R45.851]     HPI  Lawanda Miller is a 27 y.o. female who was admitted on 3/7/2023 with complaints of suicidal thoughts.   For details please see H&P dated 3/7/23.    Hospital Course  Patient is a 27 y.o. female presented with suicidal ideations. She was at a residential rehab and reportedly all her medications were stopped. The patient has a history of bipolar I disorder and was experiencing a mixed manic episode. She was started back on quetiapine, venlafaxine XR and buspirone. She also wanted to be back on MAT. After it was confirmed that she had a bed at a treatment facility where he MAT would be continued, she was resumed on Suboxone 8-2 mg daily. The patient reported feeling better with her medications and was ready to go to a new rehab facility.       Mental Status Exam upon discharge:   Mood \"good\"   Affect-congruent, appropriate, stable  Thought Content-goal directed, no delusional material present  Thought process-linear, organized.  Suicidality: No SI  Homicidality: No HI  Perception: No AH/    Procedures Performed         Consults:   Consults     No orders found from 2023 to 3/8/2023.          Pertinent Test Results:   Admission on 2023   Component Date Value Ref Range Status   • QT Interval 2023 344  ms Final   • QTC Interval 2023 448  ms Final        Condition on Discharge:  improved    Vital Signs  Temp:  [96.9 °F (36.1 °C)-97.9 °F (36.6 °C)] 96.9 °F (36.1 °C)  Heart Rate:  [93-99] 99  Resp:  " [18-20] 18  BP: (105-120)/(60-70) 105/60      Discharge Disposition:  Home or Self Care    Discharge Medications:     Discharge Medications      Changes to Medications      Instructions Start Date   buprenorphine-naloxone 8-2 MG per SL tablet  Commonly known as: SUBOXONE  What changed:   · how much to take  · when to take this   1 tablet, Sublingual, Daily   Start Date: March 11, 2023        Continue These Medications      Instructions Start Date   busPIRone 5 MG tablet  Commonly known as: BUSPAR   5 mg, Oral, 2 Times Daily      naloxone 4 MG/0.1ML nasal spray  Commonly known as: NARCAN   1 spray, Nasal, As Needed      norgestimate-ethinyl estradiol 0.18/0.215/0.25 MG-35 MCG per tablet  Commonly known as: ORTHO TRI-CYCLEN,TRINESSA   1 tablet, Oral, Daily      QUEtiapine 100 MG tablet  Commonly known as: SEROquel   100 mg, Oral, Nightly      Sodium Fluoride 5000 PPM 1.1 % paste  Generic drug: Sodium Fluoride   1 application, Oral, Daily, Use pea sized amount to brush teeth as directed daily      venlafaxine XR 75 MG 24 hr capsule  Commonly known as: EFFEXOR-XR   75 mg, Oral, Every Morning         Stop These Medications    guanFACINE 1 MG tablet  Commonly known as: TENEX     ibuprofen 600 MG tablet  Commonly known as: ADVIL,MOTRIN     ondansetron 4 MG tablet  Commonly known as: ZOFRAN     Pain Relief Extra Strength 500 MG tablet  Generic drug: acetaminophen     senna 8.6 MG tablet  Commonly known as: SENOKOT            Discharge Diet: Regular     Activity at Discharge: As tolerated     Follow-up Appointments   [facility]     Time spent in discharge: < 30 min    Clinician:   Massiel Caceres MD  03/10/23  11:47 EST